# Patient Record
Sex: FEMALE | Race: WHITE | NOT HISPANIC OR LATINO | Employment: OTHER | URBAN - METROPOLITAN AREA
[De-identification: names, ages, dates, MRNs, and addresses within clinical notes are randomized per-mention and may not be internally consistent; named-entity substitution may affect disease eponyms.]

---

## 2023-01-19 NOTE — PROGRESS NOTES
PT Evaluation     Today's date: 2023  Patient name: Nathanial Cushing  : 1960  MRN: [id number]  Referring provider: Nick Milton DPM  Dx:   Encounter Diagnosis     ICD-10-CM    1  Achilles tendinosis of left ankle  M67 874       2  Left ankle pain, unspecified chronicity  M25 572           Start Time: 1000  Stop Time: 1050  Total time in clinic (min): 50 minutes    Assessment  Assessment details: 57 yo female referred to PT with dx of  L ankle tendinosis  Pt presents with 0-6/10 pain scale-with pain mostly L med achilles region  Physical exam reveals heelcord tightenss L>R, great toe ext restriction, and sig tenderness to palp of Titi's deformity (pump bump) in L distal med achilles region  Pt demonstrates and/or c/o difficulty with  walking up hill/uneven terrain and/or for prolonged periods (>3-4 miles)   She has improved since wearing heel lift in her shoes, icing, and taking Medrol pack, but still is unable to walk inclines w/o pain  Pt is below her prior functional level due to the above limitations  She would benefit from PT intervention in order to address the above deficits so that she may resume  her daily activities at home and w/ fitness/rec activities with less pain and limitation at her premorbid  intensity and duration      Impairments: activity intolerance, impaired physical strength, lacks appropriate home exercise program and pain with function  Other impairment: pain w/ inclined/uneven terrain walking  Prognosis details: Pt will achieve the following goals in the next 2-4 weeks  STG 23  Indep with current HEP  Dec pain by 1-3 levels  rodolfo walking x 10' on TM @ 1% elevation grade    Pt will achieve the following goals by d/c (8-12 weeks, or as stated in plan)  LTG  indep and compliant with HEP in order to maximize gains achieved in therapy  0-2/10 pain scale in order to feel better and decrease/eliminate use of  pain &/or anti-inflammatory  meds  Improve heelcord flexibility to WNL and Great toe ext ROM in order to improve ankle/foot mechanics while walking and dec joint stresses   Exhibit carryover of proper  joint conservation techniques and body mechanics in order to dec unnecessary muscle strain and preserve joint integrity  Improve  FOTO score to 79 or more to indicate inc functional ability of patient  Resume activities,including fitness walking >3 miles and walking up hills/hiking, with less pain and limitation at premorbid intensity and duration    Plan  Patient would benefit from: skilled physical therapy  Planned modality interventions: cryotherapy and thermotherapy: hydrocollator packs  Planned therapy interventions: joint mobilization, manual therapy, massage, balance, neuromuscular re-education, body mechanics training, patient education, postural training, strengthening, stretching, therapeutic activities, therapeutic exercise, flexibility, gait training and home exercise program  Frequency: 2x week  Duration in visits: 12  Duration in weeks: 6  Plan of Care beginning date: 1/25/2023  Plan of Care expiration date: 3/8/2023  Treatment plan discussed with: patient        Subjective Evaluation    History of Present Illness  Onset date: Nov/Dec 2022  Mechanism of injury: 1/25/23 Eval- pt w/ c/o Left ankle pain, she believes she twisted her ankle back in December/Nov 2022, medial ankle pain into her achilles  Cont to worsened-(though pt admits to cont to walk hills, etc ) She is full weightbearing  Pt went to Dr Tamiko Smith [facility] 1/18/23-dx w/ L ankle tendinosis and prescribed medrol pack as well as referred for PT intervention  She presents today for PT    She walks about 3 and half to 4 miles a day-still doing so, but painful if not level (hills inc pain)  Has been icing, wearing heel lifts, avoiding hills and medrol pack (as prescribed by )  Has instructions to take ibuprofen when medrol pack done, but hasn't needed to (ended yesterday)  Feeling better w/ these changes  indep ADLs and general household chores  Driving indep   Not able to hike/walk hills  LEFS 68/80  Pain  Current pain ratin  At best pain ratin  At worst pain ratin  Location: L med achilles  Quality: dull ache  Aggravating factors: walking (ice)  Progression: improved (since medrol pack)    Social Support  Steps to enter house: yes (1-2)  Stairs in house: yes (reciprocal stair pattern)   Lives in: multiple-level home  Lives with: spouse    Employment status: not working (retired)  Exercise history: She walks about 3 and half to 4 miles a day-still doing so, but painful if hills-avoids and has been staying on level ground      Diagnostic Tests  X-ray: normal (L ankle 23)  Treatments  Current treatment: medication and physical therapy  Current treatment comments: medrol pack  Patient Goals  Patient goal: resume walking on any terrain/elevation for any distance desired w/o pain or limitation        Objective     Observations     Additional Observation Details  L med achilles w/ pump bump (Titi's deformity) (mild pump bump present on R achilles region)    Palpation     Additional Palpation Details  Very tender to palp of L med achilles (pump bump) which is prominent and soft to semi-soft in nature  No other pain to palp of L calf, ankle and foot  Mild soft tissue tightness/restriction along L longitudinal arch    Min joint restriction of L great toe MTP ext    Neurological Testing     Sensation     Ankle/Foot   Left Ankle/Foot   Intact: light touch and hot/cold discrimination    Right Ankle/Foot   Intact: light touch and hot/cold discrimination     Active Range of Motion   Left Ankle/Foot   Dorsiflexion (ke): WFL  Plantar flexion: WFL  Inversion: WFL  Eversion: WFL    Right Ankle/Foot   Dorsiflexion (ke): WFL  Plantar flexion: WFL  Inversion: WFL  Eversion: WFL    Additional Active Range of Motion Details  L great toe MTP ext min restricted passive and active      Passive Range of Motion   Left Hip   Flexion: 60 (SLR) degrees     Right Hip   Flexion: 50 (SLR) degrees     Additional Passive Range of Motion Details  heelcord tightness noted L>R    Strength/Myotome Testing     Left Hip   Planes of Motion   Flexion: 5    Right Hip   Planes of Motion   Flexion: 5    Left Knee   Flexion: 5  Extension: 5    Right Knee   Flexion: 5  Extension: 5    Left Ankle/Foot   Dorsiflexion: 5  Plantar flexion: 4  Inversion: 4  Eversion: 4    Right Ankle/Foot   Dorsiflexion: 5  Plantar flexion: 4  Inversion: 5  Eversion: 5    Additional Strength Details  Deep squat unsupported to 88 deg knee flex w/ heels flat intact  H/t raise unsupported intact  Toe flex grossly 5/5 R and L  Toe ext grossly 4/5 R and L    Ambulation     Comments   Amb indep without AD x clinic distances and parking lot to clinic   Pt without any sig gait deviations noted      Flowsheet Rows    Flowsheet Row Most Recent Value   PT/OT G-Codes    Current Score 71   Projected Score 79   Assessment Type Evaluation             Precautions: HL     1/125/23            Manuals #1    FOTO     Re-eval   L ankle/foot PROM/STM/IASTM AE                                                   Neuro Re-Ed                          Foam (w/o shoes):  -static/weightshift  -doming  -step f/b  -s/s -            SLS R/L -                                                                Ther Ex                            HC stretch @ step:  -gastroc  -soleus review 3x20" ea                        Heel raises on step (from lowered heel) - floor           Eccentric heel lowering on step or floor - floor           Runner's Step up -            Step down -            Ankle TB PREs (4 planes) -            Sit:  -B H/T raises  -L  HC stretch w/ towel (I/E bias)  -B doming -x10  -x10" ea dir  -x10 (10" hold)            Ther Activity                                       Gait Training                                       Modalities

## 2023-01-25 ENCOUNTER — EVALUATION (OUTPATIENT)
Age: 63
End: 2023-01-25

## 2023-01-25 DIAGNOSIS — M25.572 LEFT ANKLE PAIN, UNSPECIFIED CHRONICITY: ICD-10-CM

## 2023-01-25 DIAGNOSIS — M67.874 ACHILLES TENDINOSIS OF LEFT ANKLE: Primary | ICD-10-CM

## 2023-01-25 RX ORDER — UREA 10 %
1 LOTION (ML) TOPICAL DAILY
COMMUNITY

## 2023-01-25 RX ORDER — OMEPRAZOLE 10 MG/1
CAPSULE, DELAYED RELEASE ORAL DAILY
COMMUNITY

## 2023-01-25 RX ORDER — ASPIRIN 81 MG/1
81 TABLET, CHEWABLE ORAL DAILY
COMMUNITY

## 2023-01-25 RX ORDER — MULTIVITAMIN
1 TABLET ORAL DAILY
COMMUNITY

## 2023-01-25 NOTE — LETTER
2023    Yamini Ross, [address] [facility]  [address]  [address] [place] [address]    Patient: Marce Pederson   YOB: 1960   Date of Visit: 2023     Encounter Diagnosis     ICD-10-CM    1  Achilles tendinosis of left ankle  M67 874       2  Left ankle pain, unspecified chronicity  M25 572           Dear Dr Martinez Born: Thank you for your recent referral of Marce Pederson  Please review the attached evaluation summary from [facility] recent visit  Please verify that you agree with the plan of care by signing the attached order  If you have any questions or concerns, please do not hesitate to call  I sincerely appreciate the opportunity to share in the care of one of your patients and hope to have another opportunity to work with you in the near future  Sincerely,    Jorge Galvez, PT      Referring Provider:      I certify that I have read the below Plan of Care and certify the need for these services furnished under this plan of treatment while under my care  BERNIE [phone]  Via Fax: [phone]          PT Evaluation     Today's date: 2023  Patient name: Marce Pederson  : 1960  MRN: [id number]  Referring provider: Jorgito Allen DPM  Dx:   Encounter Diagnosis     ICD-10-CM    1  Achilles tendinosis of left ankle  M67 874       2  Left ankle pain, unspecified chronicity  M25 572           Start Time: 1000  Stop Time: 1050  Total time in clinic (min): 50 minutes    Assessment  Assessment details: 59 yo female referred to PT with dx of  L ankle tendinosis  Pt presents with 0-6/10 pain scale-with pain mostly L med achilles region  Physical exam reveals heelcord tightenss L>R, great toe ext restriction, and sig tenderness to palp of Titi's deformity (pump bump) in L distal med achilles region    Pt demonstrates and/or c/o difficulty with  walking up hill/uneven terrain and/or for prolonged periods (>3-4 miles)   She has improved since wearing heel lift in her shoes, icing, and taking Medrol pack, but still is unable to walk inclines w/o pain  Pt is below her prior functional level due to the above limitations  She would benefit from PT intervention in order to address the above deficits so that she may resume  her daily activities at home and w/ fitness/rec activities with less pain and limitation at her premorbid  intensity and duration      Impairments: activity intolerance, impaired physical strength, lacks appropriate home exercise program and pain with function  Other impairment: pain w/ inclined/uneven terrain walking  Prognosis details: Pt will achieve the following goals in the next 2-4 weeks  STG 1/25/23  Indep with current HEP  Dec pain by 1-3 levels  rodolfo walking x 10' on TM @ 1% elevation grade    Pt will achieve the following goals by d/c (8-12 weeks, or as stated in plan)  LTG  indep and compliant with HEP in order to maximize gains achieved in therapy  0-2/10 pain scale in order to feel better and decrease/eliminate use of  pain &/or anti-inflammatory  meds  Improve heelcord flexibility to WNL and Great toe ext ROM in order to improve ankle/foot mechanics while walking and dec joint stresses   Exhibit carryover of proper  joint conservation techniques and body mechanics in order to dec unnecessary muscle strain and preserve joint integrity  Improve  FOTO score to 79 or more to indicate inc functional ability of patient  Resume activities,including fitness walking >3 miles and walking up hills/hiking, with less pain and limitation at premorbid intensity and duration    Plan  Patient would benefit from: skilled physical therapy  Planned modality interventions: cryotherapy and thermotherapy: hydrocollator packs  Planned therapy interventions: joint mobilization, manual therapy, massage, balance, neuromuscular re-education, body mechanics training, patient education, postural training, strengthening, stretching, therapeutic activities, therapeutic exercise, flexibility, gait training and home exercise program  Frequency: 2x week  Duration in visits: 12  Duration in weeks: 6  Plan of Care beginning date: 2023  Plan of Care expiration date: 3/8/2023  Treatment plan discussed with: patient        Subjective Evaluation    History of Present Illness  Onset date: Nov/Dec 2022  Mechanism of injury: 23 Eval- pt w/ c/o Left ankle pain, she believes she twisted her ankle back in 2022, medial ankle pain into her achilles  Cont to worsened-(though pt admits to cont to walk hills, etc ) She is full weightbearing  [facility]/ L ankle tendinosis and prescribed medrol pack as well as referred for PT intervention  She presents today for PT    She walks about 3 and half to 4 miles a day-still doing so, but painful if not level (hills inc pain)  Has been icing, wearing heel lifts, avoiding hills and medrol pack (as prescribed by )  Has instructions to take ibuprofen when medrol pack done, but hasn't needed to (ended yesterday)  Feeling better w/ these changes  indep ADLs and general household chores  Driving indep   Not able to hike/walk hills  LEFS 68/80  Pain  Current pain ratin  At best pain ratin  At worst pain ratin  Location: L med achilles  Quality: dull ache  Aggravating factors: walking (ice)  Progression: improved (since medrol pack)    Social Support  Steps to enter house: yes (1-2)  Stairs in house: yes (reciprocal stair pattern)   Lives in: multiple-level home  Lives with: spouse    Employment status: not working (retired)  Exercise history: She walks about 3 and half to 4 miles a day-still doing so, but painful if hills-avoids and has been staying on level ground      Diagnostic Tests  X-ray: normal (L ankle 23)  Treatments  Current treatment: medication and physical therapy  Current treatment comments: medrol pack  Patient Goals  Patient goal: resume walking on any terrain/elevation for any distance desired w/o pain or limitation        Objective     Observations     Additional Observation Details  L med achilles w/ pump bump (Titi's deformity) (mild pump bump present on R achilles region)    Palpation     Additional Palpation Details  Very tender to palp of L med achilles (pump bump) which is prominent and soft to semi-soft in nature  No other pain to palp of L calf, ankle and foot  Mild soft tissue tightness/restriction along L longitudinal arch  Min joint restriction of L great toe MTP ext    Neurological Testing     Sensation     Ankle/Foot   Left Ankle/Foot   Intact: light touch and hot/cold discrimination    Right Ankle/Foot   Intact: light touch and hot/cold discrimination     Active Range of Motion   Left Ankle/Foot   Dorsiflexion (ke): WFL  Plantar flexion: WFL  Inversion: WFL  Eversion: WFL    Right Ankle/Foot   Dorsiflexion (ke): WFL  Plantar flexion: WFL  Inversion: WFL  Eversion: WFL    Additional Active Range of Motion Details  L great toe MTP ext min restricted passive and active      Passive Range of Motion   Left Hip   Flexion: 60 (SLR) degrees     Right Hip   Flexion: 50 (SLR) degrees     Additional Passive Range of Motion Details  heelcord tightness noted L>R    Strength/Myotome Testing     Left Hip   Planes of Motion   Flexion: 5    Right Hip   Planes of Motion   Flexion: 5    Left Knee   Flexion: 5  Extension: 5    Right Knee   Flexion: 5  Extension: 5    Left Ankle/Foot   Dorsiflexion: 5  Plantar flexion: 4  Inversion: 4  Eversion: 4    Right Ankle/Foot   Dorsiflexion: 5  Plantar flexion: 4  Inversion: 5  Eversion: 5    Additional Strength Details  Deep squat unsupported to 88 deg knee flex w/ heels flat intact  H/t raise unsupported intact  Toe flex grossly 5/5 R and L  Toe ext grossly 4/5 R and L    Ambulation     Comments   Amb indep without AD x clinic distances and parking lot to clinic   Pt without any sig gait deviations noted      Flowsheet Rows    Flowsheet Row Most Recent Value   PT/OT G-Codes    Current Score 71   Projected Score 79   Assessment Type Evaluation            Precautions: HL     1/125/23            Manuals #1    FOTO     Re-eval   L ankle/foot PROM/STM/IASTM AE                                                   Neuro Re-Ed                          Foam (w/o shoes):  -static/weightshift  -doming  -step f/b  -s/s -            SLS R/L -                                                                Ther Ex                            HC stretch @ step:  -gastroc  -soleus review 3x20" ea                        Heel raises on step (from lowered heel) - floor           Eccentric heel lowering on step or floor - floor           Runner's Step up -            Step down -            Ankle TB PREs (4 planes) -            Sit:  -B H/T raises  -L  HC stretch w/ towel (I/E bias)  -B doming -x10  -x10" ea dir  -x10 (10" hold)            Ther Activity                                       Gait Training                                       Modalities

## 2023-01-26 NOTE — PROGRESS NOTES
Daily Note     Today's date: 2023  Patient name: Juani Clarke  : 1960  MRN: [id number]  Referring provider: Abby Cunha DPM  Dx:   Encounter Diagnosis     ICD-10-CM    1  Achilles tendinosis of left ankle  M67 874       2  Left ankle pain, unspecified chronicity  M25 572           Start Time: 730  Stop Time: 08  Total time in clinic (min): 31 minutes    Subjective: pt reports no issues w/ exercises initiated at eval   Pt w/ questions concerning previous exer (from PT for R foot) and their application to current diagnosis      Objective: See treatment diary below      Assessment:Pt tolerated today's treatment session well    Initiated standing gastroc/soleus stretches, step downs,concentric/eccentric gastroc exer, foam doming and step overs  today during session and patient education provided on progression and management of sx w/ HEP   Pt challenged with step downs and eccentric gastroc exer  Pt completed exer with no adverse reactions    Pt continues to benefit from skilled PT services  Will continue to encourage HEP and PT attendance while addressing pt's  functional deficits & focusing on progression of POC as patient tolerates  Plan: Continue per plan of care        Precautions: HL     23           Manuals #1 #2   FOTO     Re-eval   L ankle/foot PROM/STM/IASTM AE -                                                  Neuro Re-Ed                          Foam (w/o shoes):  -static/weightshift  -doming  -step f/b  -s/s -     -  x10  x10  x10 ea side           SLS R/L - - *                                                              Ther Ex                            HC stretch @ step:  -gastroc  -soleus review 3x20" ea 3x20" ea                       Heel raises on step (from lowered heel) - Step x 10           Eccentric heel lowering on step or floor - Floor x10           Runner's Step up - - *          Step down (forward) - x10   8" step           Ankle TB PREs (4 planes) - - Sit:  -B H/T raises  -L  HC stretch w/ towel (I/E bias)  -B doming -x10  -x10" ea dir  -x10 (10" hold) review           Ther Activity                                       Gait Training                                       Modalities

## 2023-01-27 ENCOUNTER — OFFICE VISIT (OUTPATIENT)
Age: 63
End: 2023-01-27

## 2023-01-27 DIAGNOSIS — M25.572 LEFT ANKLE PAIN, UNSPECIFIED CHRONICITY: ICD-10-CM

## 2023-01-27 DIAGNOSIS — M67.874 ACHILLES TENDINOSIS OF LEFT ANKLE: Primary | ICD-10-CM

## 2023-01-30 NOTE — PROGRESS NOTES
Daily Note     Today's date: 2023  Patient name: Santo Tucker  : 1960  MRN: [id number]  Referring provider: Maciel Sweeney DPM  Dx:   Encounter Diagnosis     ICD-10-CM    1  Achilles tendinosis of left ankle  M67 874       2  Left ankle pain, unspecified chronicity  M25 572           Start Time: 08  Stop Time: 0900  Total time in clinic (min): 31 minutes    Subjective: pt reports she walked 4 miles yesterday w/o inc pain (level surfaces) feels the exer are helping a little  Has questions about some of the exer      Objective: See treatment diary below      Assessment: Pt tolerated today's treatment session well    Initiated runner's step up and SLS x 30"  today during session and patient education provided on progression of HEP and attempting short distance elevation in her daily walk as a trial in the next week or so   Pt completed exer with no adverse reactions    Pt's questions answered and pt demonstrated good understnading and technique w/ exer as well  Pt continues to benefit from skilled PT services  Will continue to encourage HEP and PT attendance while addressing pt's  functional deficits & focusing on progression of POC as patient tolerates  Plan: Continue per plan of care        Precautions: HL     23          Manuals #1 #2 #3  FOTO     Re-eval   L ankle/foot PROM/STM/IASTM AE - -                                                 Neuro Re-Ed                          Foam (w/o shoes):    -doming  -step f/b  -s/s -     x10  x10  x10 ea side     x10  x10  x10 ea side          SLS R/L - - 2x30" L (and R) floor W/ b arm raise                                                             Ther Ex                            HC stretch @ step:  -gastroc  -soleus review 3x20" ea 3x20" ea                       Heel raises on step (from lowered heel) -  x 10 floor x10 floor step         Eccentric heel lowering on step or floor - Floor x10 x10 R/L          Runner's Step up - - X 10          Step down (forward) - x10   8" step x10          Ankle TB PREs (4 planes) - - -          Sit:  -B H/T raises  -L  HC stretch w/ towel (I/E bias)  -B doming -x10  -x10" ea dir  -x10 (10" hold) review review          Ther Activity                                       Gait Training                                       Modalities

## 2023-02-01 ENCOUNTER — OFFICE VISIT (OUTPATIENT)
Age: 63
End: 2023-02-01

## 2023-02-01 DIAGNOSIS — M67.874 ACHILLES TENDINOSIS OF LEFT ANKLE: Primary | ICD-10-CM

## 2023-02-01 DIAGNOSIS — M25.572 LEFT ANKLE PAIN, UNSPECIFIED CHRONICITY: ICD-10-CM

## 2023-02-01 NOTE — PROGRESS NOTES
Daily Note     Today's date: 2/3/2023  Patient name: Regi Abbasi  : 1960  MRN: [id number]  Referring provider: Ivonne Valencia DPM  Dx:   Encounter Diagnosis     ICD-10-CM    1  Achilles tendinosis of left ankle  M67 874       2  Left ankle pain, unspecified chronicity  M25 572           Start Time: 0815  Stop Time: 0830  Total time in clinic (min): 15 minutes    Subjective: pt reports consistently walking 4 miles-level only  Hasn't tried any inclines yet, but plans to in the next week       Objective: See treatment diary below      Assessment: Pt tolerated today's treatment session well    Initiated heel raises on step today(starting on stretch) today during session and patient education provided on progression of HEP   Pt completed exer with proper technique     and no adverse reactions    Pt demonstrates good knowledge of and compliance w/ HEP  Pt still painful to palp of med distal achilles w/ soft lump (pump bump) still present  Pt continues to benefit from skilled PT services  Will continue to encourage HEP and PT attendance while addressing pt's  functional deficits & focusing on progression of POC as patient tolerates  Plan: Continue per plan of care        Precautions: HL     1/125/23 1/27/23 2/1/23 2/3/23         Manuals #1 #2 #3 #4 FOTO     Re-eval   L ankle/foot PROM/STM/IASTM AE - - -                                                Neuro Re-Ed                          Foam (w/o shoes):    -doming  -step f/b  -s/s -     x10  x10  x10 ea side     x10  x10  x10 ea side     review         SLS R/L - - 2x30" L (and R) floor W/ b arm raise                                                             Ther Ex                            HC stretch @ step:  -gastroc  -soleus review 3x20" ea 3x20" ea 3x20" ea                      Heel raises on step (from lowered heel) -  x 10 floor x10 floor 2x10 step         Eccentric heel lowering on step or floor - Floor x10 x10 R/L x10 r/L         Runner's Step up - - X 10 review         Step down (forward) - x10   8" step x10 review         Ankle TB PREs (4 planes) - - - - *        Sit:  -B H/T raises  -L  HC stretch w/ towel (I/E bias)  -B doming -x10  -x10" ea dir  -x10 (10" hold) review review   D/c  -review  x10 stand         Ther Activity                                       Gait Training                                       Modalities

## 2023-02-03 ENCOUNTER — OFFICE VISIT (OUTPATIENT)
Age: 63
End: 2023-02-03

## 2023-02-03 DIAGNOSIS — M25.572 LEFT ANKLE PAIN, UNSPECIFIED CHRONICITY: ICD-10-CM

## 2023-02-03 DIAGNOSIS — M67.874 ACHILLES TENDINOSIS OF LEFT ANKLE: Primary | ICD-10-CM

## 2023-02-06 NOTE — PROGRESS NOTES
Daily Note     Today's date: 2/10/2023  Patient name: Julio Boggs  : 1960  MRN: [id number]  Referring provider: Jean Marie Thacker DPM  Dx:   Encounter Diagnosis     ICD-10-CM    1  Achilles tendinosis of left ankle  M67 874       2  Left ankle pain, unspecified chronicity  M25 572           Start Time: 0815  Stop Time: 835  Total time in clinic (min): 20 minutes    Subjective: pt reports trying to amb up an incline (down went ok) and had pain  Able to walk level surfaces x 4 miles, but the inclines still bothersome      Objective: See treatment diary below      Assessment: Pt tolerated today's treatment session without issue   Initiated L ankle TB PREs (d,P,E,I) and eccentric heel lowering on step (v  Floor) today during session and patient education provided on trying to stretch prior to amb inclines/fitness walks to see if that helps rodolfo inclines better     Pt completed exer with no adverse reactions    pt still very tender to palp of L med achilles region-therefore manual interventions to be held  Pt w/ very good compliance w/ HEP/management  Will have pt perform HEPand self manage x ~ 2 weeks and then return for f/u /reassessment of status-possible manual interventions at that time if sensitiviy to palp improved  Pt continues to benefit from skilled PT services  Will continue to encourage HEP and PT attendance while addressing pt's  functional deficits & focusing on progression of POC as patient tolerates  Plan: Continue per plan of care        Precautions: HL     1/125/23 1/27/23 2/1/23 2/3/23 2/10/23        Manuals #1 #2 #3 #4 #5 FOTO    Re-eval   L ankle/foot PROM/STM/IASTM AE - - - -                                               Neuro Re-Ed                          Foam (w/o shoes):    -doming  -step f/b  -s/s -     x10  x10  x10 ea side     x10  x10  x10 ea side     review     review        SLS R/L - - 2x30" L (and R) floor W/ b arm raise review Ther Ex                            HC stretch @ step:  -gastroc  -soleus review 3x20" ea 3x20" ea 3x20" ea 3x20"                     Heel raises on step (from lowered heel) -  x 10 floor x10 floor 2x10 step 2x10 step        Eccentric heel lowering on step or floor - Floor x10 x10 R/L x10 r/L floor x10 on step        Runner's Step up - - X 10 review review        Step down (forward) - x10   8" step x10 review review        Ankle TB PREs (4 planes) - - - - Light grTB x 10 ea plane        Sit:  -B H/T raises  -L  HC stretch w/ towel (I/E bias)  -B doming -x10  -x10" ea dir  -x10 (10" hold) review review   D/c  -review  x10 stand   D/c  -review  x10 stand        Ther Activity                                       Gait Training                                       Modalities

## 2023-02-10 ENCOUNTER — OFFICE VISIT (OUTPATIENT)
Age: 63
End: 2023-02-10

## 2023-02-10 DIAGNOSIS — M25.572 LEFT ANKLE PAIN, UNSPECIFIED CHRONICITY: ICD-10-CM

## 2023-02-10 DIAGNOSIS — M67.874 ACHILLES TENDINOSIS OF LEFT ANKLE: Primary | ICD-10-CM

## 2023-02-15 ENCOUNTER — APPOINTMENT (OUTPATIENT)
Age: 63
End: 2023-02-15

## 2023-02-20 NOTE — PROGRESS NOTES
Daily Note     Today's date: 2023  Patient name: Christopher Randhawa  : 1960  MRN: [id number]  Referring provider: Octavia Colvin DPM  Dx:   Encounter Diagnosis     ICD-10-CM    1  Achilles tendinosis of left ankle  M67 874       2  Left ankle pain, unspecified chronicity  M25 572           Start Time: 910  Stop Time: 930  Total time in clinic (min): 20 minutes    Subjective: pt reports she hasn't walked (fitness) since last week and her foot/heel is feeling better  Objective: See treatment diary below      Assessment: Pt cont w/ sig tenderness to palp of Titi's deformity (pump bump) of L med achilles region which remains warm to touch as well  Pt encouraged to cont w/ HEP and pt to do a 2 week trial of ibuprofen to try and combat cont inflammation of that area  Discussed w/ pt that she should take ibuprofen w/ food and discontinued if she has GI upset  Pt's goal continues to be able to rodolfo fitness walks and acknowledges that she may need to slow her pace and distance at this time  Prior to recent break from fitness walks, pt hadn't been able to introduce inclines/hills w/o inc pain or rodolfo >3 mile walks w/o inc sx as well  HEP reviewed w/ pt and she demonstrates a very good understanding of it         Plan: pt to call in 2 weeks w/ status     Precautions: HL     1/125/23 1/27/23 2/1/23 2/3/23 2/10/23 2/22/23       Manuals #1 #2 #3 #4 #5 FOTO-done #6    Re-eval   L ankle/foot PROM/STM/IASTM AE - - - - AE                                              Neuro Re-Ed             Hep review/injury mgmt educ - - - - - AE       Foam (w/o shoes):    -doming  -step f/b  -s/s -     x10  x10  x10 ea side     x10  x10  x10 ea side     review     review review        SLS R/L - - 2x30" L (and R) floor W/ b arm raise review review                                                           Ther Ex                            HC stretch @ step:  -gastroc  -soleus review 3x20" ea 3x20" ea 3x20" ea 3x20" 3x20" Heel raises on step (from lowered heel) -  x 10 floor x10 floor 2x10 step 2x10 step 2x10 step       Eccentric heel lowering on step or floor - Floor x10 x10 R/L x10 r/L floor x10 on step review       Runner's Step up - - X 10 review review x10       Step down (forward) - x10   8" step x10 review review x10       Ankle TB PREs (4 planes) - - - - Light grTB x 10 ea plane review       Sit:  -B H/T raises  -L  HC stretch w/ towel (I/E bias)  -B doming -x10  -x10" ea dir  -x10 (10" hold) review review   D/c  -review  x10 stand   D/c  -review  x10 stand   D/c  -review  x10 stand       Ther Activity                                       Gait Training                                       Modalities

## 2023-02-22 ENCOUNTER — OFFICE VISIT (OUTPATIENT)
Age: 63
End: 2023-02-22

## 2023-02-22 DIAGNOSIS — M25.572 LEFT ANKLE PAIN, UNSPECIFIED CHRONICITY: ICD-10-CM

## 2023-02-22 DIAGNOSIS — M67.874 ACHILLES TENDINOSIS OF LEFT ANKLE: Primary | ICD-10-CM

## 2024-11-11 ENCOUNTER — EVALUATION (OUTPATIENT)
Dept: PHYSICAL THERAPY | Facility: MEDICAL CENTER | Age: 64
End: 2024-11-11
Payer: MEDICARE

## 2024-11-11 DIAGNOSIS — M76.62 NONINSERTIONAL TENDINOPATHY OF LEFT ACHILLES TENDON: Primary | ICD-10-CM

## 2024-11-11 PROCEDURE — 97161 PT EVAL LOW COMPLEX 20 MIN: CPT | Performed by: PHYSICAL THERAPIST

## 2024-11-11 NOTE — PROGRESS NOTES
PT Evaluation     Today's date: 2024  Patient name: Mary Donahue  : 1960  MRN: [id number]  Referring provider: Fabby Montejo DO  Dx:   Encounter Diagnosis     ICD-10-CM    1. Noninsertional tendinopathy of left Achilles tendon  M76.62                      Assessment  Impairments: abnormal muscle firing, abnormal muscle tone, abnormal or restricted ROM, impaired physical strength, lacks appropriate home exercise program and pain with function    Assessment details: .Mary Donahue is a 64 y.o. female was evaluated on 2024  for Noninsertional tendinopathy of left Achilles tendon  (primary encounter diagnosis). Mary Donahue has the above listed impairments resulting in functional deficits and negative impact to quality of life.  Patient is appropriate for skilled PT intervention to promote maximal return to function and patient specific goals.      Patient agrees with outlined treatment plan and all questions were answered to their satisfaction.      Understanding of Dx/Px/POC: good     Prognosis: good    Goals  Patient will successfully transition to home exercise program.  Patient will be able to manage symptoms independently.    Mayr will report no limitation in walking 3 miles   will report no limitation in walking up hills       Plan  Patient would benefit from: skilled PT  Referral necessary: No  Planned modality interventions: thermotherapy: hydrocollator packs    Planned therapy interventions: home exercise program, manual therapy, neuromuscular re-education, patient education, functional ROM exercises, strengthening, stretching, joint mobilization, graded activity, graded exercise, therapeutic exercise, body mechanics training, motor coordination training and activity modification    Frequency: 1x week  Duration in weeks: 12  Treatment plan discussed with: patient        Subjective Evaluation    History of Present Illness  Mechanism of injury: Mary Donahue is a 64 y.o. female presenting  to therapy with complaints of left achilles pain.   She has been dealing with pain for 2 years or so, did one course of therapy and had improvement in her right achilles however left side remained largely unchanged.  Before proceeding with any more invasive interventions, she was referred to trial EPAT.  She notes pain limits her walking tolerance, mostly at 3 miles or more or inclines are more bothersome than flat.     Patient Goals  Patient goals for therapy: decreased pain, increased motion, return to sport/leisure activities, independence with ADLs/IADLs and increased strength    Pain  Current pain ratin  At best pain ratin  At worst pain ratin  Quality: dull ache, discomfort, tight, pulling and squeezing          Objective     Observations     Additional Observation Details  Thickening of mid portion achilles on L compared to R     Palpation   Left   No palpable tenderness to the lateral gastrocnemius and medial gastrocnemius.     Right   No palpable tenderness to the lateral gastrocnemius and medial gastrocnemius.     Tenderness     Additional Tenderness Details  + TTP mid portion of L achilles     Neurological Testing     Sensation     Ankle/Foot   Left Ankle/Foot   Intact: light touch    Right Ankle/Foot   Intact: light touch     Active Range of Motion   Left Ankle/Foot   Normal active range of motion    Right Ankle/Foot   Normal active range of motion    Joint Play   Left Ankle/Foot  Hypomobile in the talocrural joint.     Strength/Myotome Testing     Left Ankle/Foot   Dorsiflexion: 5  Plantar flexion: 4-  Inversion: 5  Eversion: 5    Right Ankle/Foot   Dorsiflexion: 5  Plantar flexion: 4+  Inversion: 5  Eversion: 5             Precautions: None      Manuals             TCJ distraction AF                                                    Neuro Re-Ed                                                                                                        Ther Ex             DL and SL heel  raise vs wall 30                                                                                                       Ther Activity                                       Gait Training                                       Modalities

## 2024-11-11 NOTE — LETTER
2024    Fabby Montejo DO  [address]  [address]  [facility] [address]    Patient: Mary Donahue   YOB: 1960   Date of Visit: 2024     Encounter Diagnosis     ICD-10-CM    1. Noninsertional tendinopathy of left Achilles tendon  M76.62           Dear Dr. Montejo:    Thank you for your recent referral of Mary Donahue. Please review the attached evaluation summary from 's recent visit.     Please verify that you agree with the plan of care by signing the attached order.     If you have any questions or concerns, please do not hesitate to call.     I sincerely appreciate the opportunity to share in the care of one of your patients and hope to have another opportunity to work with you in the near future.       Sincerely,    Sravan Funes, PT      Referring Provider:      I certify that I have read the below Plan of Care and certify the need for these services furnished under this plan of treatment while under my care.                    Fabby Montejo DO  [address]  [address]  [facility] [address]  Via Fax: [phone]          PT Evaluation     Today's date: 2024  Patient name: Mary Donahue  : 1960  MRN: [id number]  Referring provider: Fabby Montejo DO  Dx:   Encounter Diagnosis     ICD-10-CM    1. Noninsertional tendinopathy of left Achilles tendon  M76.62                      Assessment  Impairments: abnormal muscle firing, abnormal muscle tone, abnormal or restricted ROM, impaired physical strength, lacks appropriate home exercise program and pain with function    Assessment details: .Mary Donahue is a 64 y.o. female was evaluated on 2024  for Noninsertional tendinopathy of left Achilles tendon  (primary encounter diagnosis). Mary Donahue has the above listed impairments resulting in functional deficits and negative impact to quality of life.  Patient is appropriate for skilled PT intervention to promote maximal return to function and  patient specific goals.      Patient agrees with outlined treatment plan and all questions were answered to their satisfaction.      Understanding of Dx/Px/POC: good     Prognosis: good    Goals  Patient will successfully transition to home exercise program.  Patient will be able to manage symptoms independently.     will report no limitation in walking 3 miles   will report no limitation in walking up hills       Plan  Patient would benefit from: skilled PT  Referral necessary: No  Planned modality interventions: thermotherapy: hydrocollator packs    Planned therapy interventions: home exercise program, manual therapy, neuromuscular re-education, patient education, functional ROM exercises, strengthening, stretching, joint mobilization, graded activity, graded exercise, therapeutic exercise, body mechanics training, motor coordination training and activity modification    Frequency: 1x week  Duration in weeks: 12  Treatment plan discussed with: patient        Subjective Evaluation    History of Present Illness  Mechanism of injury: Mary Donahue is a 64 y.o. female presenting to therapy with complaints of left achilles pain.   She has been dealing with pain for 2 years or so, did one course of therapy and had improvement in her right achilles however left side remained largely unchanged.  Before proceeding with any more invasive interventions, she was referred to trial EPAT.  She notes pain limits her walking tolerance, mostly at 3 miles or more or inclines are more bothersome than flat.     Patient Goals  Patient goals for therapy: decreased pain, increased motion, return to sport/leisure activities, independence with ADLs/IADLs and increased strength    Pain  Current pain ratin  At best pain ratin  At worst pain ratin  Quality: dull ache, discomfort, tight, pulling and squeezing          Objective     Observations     Additional Observation Details  Thickening of mid portion achilles on L  compared to R     Palpation   Left   No palpable tenderness to the lateral gastrocnemius and medial gastrocnemius.     Right   No palpable tenderness to the lateral gastrocnemius and medial gastrocnemius.     Tenderness     Additional Tenderness Details  + TTP mid portion of L achilles     Neurological Testing     Sensation     Ankle/Foot   Left Ankle/Foot   Intact: light touch    Right Ankle/Foot   Intact: light touch     Active Range of Motion   Left Ankle/Foot   Normal active range of motion    Right Ankle/Foot   Normal active range of motion    Joint Play   Left Ankle/Foot  Hypomobile in the talocrural joint.     Strength/Myotome Testing     Left Ankle/Foot   Dorsiflexion: 5  Plantar flexion: 4-  Inversion: 5  Eversion: 5    Right Ankle/Foot   Dorsiflexion: 5  Plantar flexion: 4+  Inversion: 5  Eversion: 5             Precautions: None      Manuals 11/11            TCJ distraction AF                                                    Neuro Re-Ed                                                                                                        Ther Ex             DL and SL heel raise vs wall 30                                                                                                       Ther Activity                                       Gait Training                                       Modalities

## 2024-11-18 ENCOUNTER — OFFICE VISIT (OUTPATIENT)
Dept: PHYSICAL THERAPY | Facility: MEDICAL CENTER | Age: 64
End: 2024-11-18
Payer: MEDICARE

## 2024-11-18 DIAGNOSIS — M76.62 NONINSERTIONAL TENDINOPATHY OF LEFT ACHILLES TENDON: Primary | ICD-10-CM

## 2024-11-18 PROCEDURE — 97140 MANUAL THERAPY 1/> REGIONS: CPT | Performed by: PHYSICAL THERAPIST

## 2024-11-18 PROCEDURE — 97110 THERAPEUTIC EXERCISES: CPT | Performed by: PHYSICAL THERAPIST

## 2024-11-18 NOTE — PROGRESS NOTES
Daily Note     Today's date: 2024  Patient name: Mary Donahue  : 1960  MRN: [id number]  Referring provider: Fabby Montejo DO  Dx:   Encounter Diagnosis     ICD-10-CM    1. Noninsertional tendinopathy of left Achilles tendon  M76.62                      Subjective:  reports no issues after initial EPAT treatment, HEP going well       Objective: See treatment diary below      Assessment: Tolerated treatment well. Patient exhibited good technique with therapeutic exercises and would benefit from continued PT      Plan: Continue per plan of care.      Precautions: None      Manuals             TCJ distraction AF                                                    Neuro Re-Ed                                                                                                        Ther Ex             DL and SL heel raise vs wall 30            Gastroc stretch 30 sec X 3             Self ankle mob with band 30            Heel raise off daniel 30                                                                Ther Activity                                       Gait Training                                       Modalities

## 2024-11-26 ENCOUNTER — OFFICE VISIT (OUTPATIENT)
Dept: PHYSICAL THERAPY | Facility: MEDICAL CENTER | Age: 64
End: 2024-11-26
Payer: MEDICARE

## 2024-11-26 DIAGNOSIS — M76.62 NONINSERTIONAL TENDINOPATHY OF LEFT ACHILLES TENDON: Primary | ICD-10-CM

## 2024-11-26 PROCEDURE — 97110 THERAPEUTIC EXERCISES: CPT | Performed by: PHYSICAL THERAPIST

## 2024-11-26 PROCEDURE — 97140 MANUAL THERAPY 1/> REGIONS: CPT | Performed by: PHYSICAL THERAPIST

## 2024-11-27 NOTE — PROGRESS NOTES
Daily Note     Today's date: 2024  Patient name: Mary Donahue  : 1960  MRN: [id number]  Referring provider: Fabby Montejo DO  Dx:   Encounter Diagnosis     ICD-10-CM    1. Noninsertional tendinopathy of left Achilles tendon  M76.62                      Subjective:  reports no issues after initial EPAT treatment, HEP going well       Objective: See treatment diary below      Assessment: Tolerated treatment well. Patient exhibited good technique with therapeutic exercises and would benefit from continued PT      Plan: Continue per plan of care.      Precautions: None      Manuals             TCJ distraction AF                                                    Neuro Re-Ed                                                                                                        Ther Ex             DL and SL heel raise vs wall 30            Gastroc stretch 30 sec X 3             Self ankle mob with band 30            Heel raise off daniel 30                                                                Ther Activity                                       Gait Training                                       Modalities

## 2024-12-02 ENCOUNTER — OFFICE VISIT (OUTPATIENT)
Dept: PHYSICAL THERAPY | Facility: MEDICAL CENTER | Age: 64
End: 2024-12-02
Payer: MEDICARE

## 2024-12-02 DIAGNOSIS — M76.62 NONINSERTIONAL TENDINOPATHY OF LEFT ACHILLES TENDON: Primary | ICD-10-CM

## 2024-12-02 PROCEDURE — 97140 MANUAL THERAPY 1/> REGIONS: CPT | Performed by: PHYSICAL THERAPIST

## 2024-12-02 PROCEDURE — 97110 THERAPEUTIC EXERCISES: CPT | Performed by: PHYSICAL THERAPIST

## 2024-12-02 NOTE — PROGRESS NOTES
Daily Note     Today's date: 2024  Patient name: Mary Donahue  : 1960  MRN: [id number]  Referring provider: Fabby Montejo DO  Dx:   Encounter Diagnosis     ICD-10-CM    1. Noninsertional tendinopathy of left Achilles tendon  M76.62                      Subjective:  reports no issues, not noticing much improvement       Objective: See treatment diary below      Assessment: Tolerated treatment well. Patient exhibited good technique with therapeutic exercises and would benefit from continued PT      Plan: Continue per plan of care.      Precautions: None      Manuals             TCJ distraction AF                                                    Neuro Re-Ed                                                                                                        Ther Ex             DL and SL heel raise vs wall 30            Gastroc stretch 30 sec X 3             Self ankle mob with band 30            Heel raise off daniel 30                                                                Ther Activity                                       Gait Training                                       Modalities

## 2024-12-16 ENCOUNTER — OFFICE VISIT (OUTPATIENT)
Dept: PHYSICAL THERAPY | Facility: MEDICAL CENTER | Age: 64
End: 2024-12-16
Payer: MEDICARE

## 2024-12-16 DIAGNOSIS — M76.62 NONINSERTIONAL TENDINOPATHY OF LEFT ACHILLES TENDON: Primary | ICD-10-CM

## 2024-12-16 PROCEDURE — 97110 THERAPEUTIC EXERCISES: CPT | Performed by: PHYSICAL THERAPIST

## 2024-12-16 PROCEDURE — 97140 MANUAL THERAPY 1/> REGIONS: CPT | Performed by: PHYSICAL THERAPIST

## 2024-12-16 NOTE — PROGRESS NOTES
Daily Note     Today's date: 2024  Patient name: Mary Donahue  : 1960  MRN: [id number]  Referring provider: Fabby Montejo DO  Dx:   Encounter Diagnosis     ICD-10-CM    1. Noninsertional tendinopathy of left Achilles tendon  M76.62                      Subjective:  reports no issues, not noticing much improvement       Objective: See treatment diary below      Assessment: Tolerated treatment well. Patient exhibited good technique with therapeutic exercises and would benefit from continued PT      Plan: Continue per plan of care.      Precautions: None      Manuals             TCJ distraction AF                                                    Neuro Re-Ed                                                                                                        Ther Ex             DL and SL heel raise vs wall 30            Gastroc stretch 30 sec X 3             Self ankle mob with band 30            Heel raise off daniel 30            Heel raise stretched position  30                                                   Ther Activity                                       Gait Training                                       Modalities

## 2024-12-27 ENCOUNTER — OFFICE VISIT (OUTPATIENT)
Dept: PHYSICAL THERAPY | Facility: MEDICAL CENTER | Age: 64
End: 2024-12-27
Payer: MEDICARE

## 2024-12-27 DIAGNOSIS — M76.62 NONINSERTIONAL TENDINOPATHY OF LEFT ACHILLES TENDON: Primary | ICD-10-CM

## 2024-12-27 PROCEDURE — 97110 THERAPEUTIC EXERCISES: CPT | Performed by: PHYSICAL THERAPIST

## 2024-12-27 PROCEDURE — 97140 MANUAL THERAPY 1/> REGIONS: CPT | Performed by: PHYSICAL THERAPIST

## 2024-12-27 NOTE — PROGRESS NOTES
Daily Note     Today's date: 2024  Patient name: Mary Donahue  : 1960  MRN: [id number]  Referring provider: Fabby Montejo DO  Dx:   Encounter Diagnosis     ICD-10-CM    1. Noninsertional tendinopathy of left Achilles tendon  M76.62                      Subjective:  reports no issues, noticing much improvement       Objective: See treatment diary below      Assessment: Tolerated treatment well. Patient exhibited good technique with therapeutic exercises and would benefit from continued PT    Hold on further therapy as things have improved, if there is a worsening of symptoms she will return       Plan: Continue per plan of care.      Precautions: None      Manuals             TCJ distraction AF                                                    Neuro Re-Ed                                                                                                        Ther Ex             DL and SL heel raise vs wall 30            Gastroc stretch 30 sec X 3             Self ankle mob with band 30            Heel raise off daniel 30            Heel raise stretched position  30                                                   Ther Activity                                       Gait Training                                       Modalities